# Patient Record
Sex: MALE | Race: OTHER | HISPANIC OR LATINO | URBAN - METROPOLITAN AREA
[De-identification: names, ages, dates, MRNs, and addresses within clinical notes are randomized per-mention and may not be internally consistent; named-entity substitution may affect disease eponyms.]

---

## 2018-06-22 ENCOUNTER — EMERGENCY (EMERGENCY)
Facility: HOSPITAL | Age: 23
LOS: 0 days | Discharge: HOME | End: 2018-06-22
Attending: EMERGENCY MEDICINE | Admitting: EMERGENCY MEDICINE

## 2018-06-22 VITALS
SYSTOLIC BLOOD PRESSURE: 112 MMHG | RESPIRATION RATE: 18 BRPM | TEMPERATURE: 98 F | OXYGEN SATURATION: 100 % | DIASTOLIC BLOOD PRESSURE: 67 MMHG | HEART RATE: 75 BPM

## 2018-06-22 VITALS
DIASTOLIC BLOOD PRESSURE: 58 MMHG | OXYGEN SATURATION: 100 % | TEMPERATURE: 98 F | SYSTOLIC BLOOD PRESSURE: 112 MMHG | HEART RATE: 86 BPM | RESPIRATION RATE: 18 BRPM
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
## 2018-06-22 DIAGNOSIS — F10.129 ALCOHOL ABUSE WITH INTOXICATION, UNSPECIFIED: ICD-10-CM
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
## 2018-06-22 DIAGNOSIS — S00.31XA ABRASION OF NOSE, INITIAL ENCOUNTER: ICD-10-CM

## 2018-06-22 DIAGNOSIS — S00.211A ABRASION OF RIGHT EYELID AND PERIOCULAR AREA, INITIAL ENCOUNTER: ICD-10-CM
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
## 2018-06-22 DIAGNOSIS — Z23 ENCOUNTER FOR IMMUNIZATION: ICD-10-CM

## 2018-06-22 RX ORDER — TETANUS TOXOID, REDUCED DIPHTHERIA TOXOID AND ACELLULAR PERTUSSIS VACCINE, ADSORBED 5; 2.5; 8; 8; 2.5 [IU]/.5ML; [IU]/.5ML; UG/.5ML; UG/.5ML; UG/.5ML
0.5 SUSPENSION INTRAMUSCULAR ONCE
Qty: 0 | Refills: 0 | Status: COMPLETED | OUTPATIENT
Start: 2018-06-22 | End: 2018-06-22

## 2018-06-22 RX ADMIN — TETANUS TOXOID, REDUCED DIPHTHERIA TOXOID AND ACELLULAR PERTUSSIS VACCINE, ADSORBED 0.5 MILLILITER(S): 5; 2.5; 8; 8; 2.5 SUSPENSION INTRAMUSCULAR at 05:47

## 2018-06-22 NOTE — ED PROVIDER NOTE - ENMT, MLM
Airway patent, Nasal mucosa clear, no septal hematoma, no nasal bony tenderness. Mouth with normal mucosa. Throat has no vesicles, no oropharyngeal exudates and uvula is midline.

## 2018-06-22 NOTE — ED PROVIDER NOTE - NEUROLOGICAL, MLM
Alert and oriented x 3, no focal deficits, no motor or sensory deficits. Alert and oriented x 3, normal gait, no focal deficits, no motor or sensory deficits.
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
## 2018-06-22 NOTE — ED PROVIDER NOTE - PROGRESS NOTE DETAILS
Patient sleeping comfortably Patient able to ambulate without difficulty. Patient drinking fluids. Discussed discharge plan. Patient understands and agrees with discharge plan